# Patient Record
Sex: MALE | Race: WHITE | NOT HISPANIC OR LATINO | URBAN - METROPOLITAN AREA
[De-identification: names, ages, dates, MRNs, and addresses within clinical notes are randomized per-mention and may not be internally consistent; named-entity substitution may affect disease eponyms.]

---

## 2017-09-05 ENCOUNTER — EMERGENCY (EMERGENCY)
Facility: HOSPITAL | Age: 27
LOS: 1 days | Discharge: HOME | End: 2017-09-05
Attending: EMERGENCY MEDICINE | Admitting: EMERGENCY MEDICINE
Payer: COMMERCIAL

## 2017-09-05 VITALS
SYSTOLIC BLOOD PRESSURE: 141 MMHG | HEART RATE: 92 BPM | TEMPERATURE: 99 F | RESPIRATION RATE: 20 BRPM | OXYGEN SATURATION: 98 % | DIASTOLIC BLOOD PRESSURE: 82 MMHG

## 2017-09-05 DIAGNOSIS — R07.89 OTHER CHEST PAIN: ICD-10-CM

## 2017-09-05 DIAGNOSIS — M54.2 CERVICALGIA: ICD-10-CM

## 2017-09-05 DIAGNOSIS — Z79.899 OTHER LONG TERM (CURRENT) DRUG THERAPY: ICD-10-CM

## 2017-09-05 PROCEDURE — 71020: CPT | Mod: 26

## 2017-09-05 PROCEDURE — 99284 EMERGENCY DEPT VISIT MOD MDM: CPT | Mod: 25

## 2017-09-05 PROCEDURE — 93010 ELECTROCARDIOGRAM REPORT: CPT

## 2017-09-05 NOTE — ED PROVIDER NOTE - MEDICAL DECISION MAKING DETAILS
Pt with atypical CP.  Possibly MSK given neck pain as well.  EKG benign.  CXR negative.  Discussed ddx with pt.  Offered blood work but declines.  Will have him f/u with out pt PCP/cardiology.  Pt has no PCP here in [facility] so referral given.

## 2017-09-05 NOTE — ED PROVIDER NOTE - DIAGNOSTIC INTERPRETATION
ER Physician: Dr Womack   CHEST XRAY INTERPRETATION: lungs clear, heart shadow normal, bony structures intact

## 2017-09-05 NOTE — ED PROVIDER NOTE - OBJECTIVE STATEMENT
28 yo M w/ history of anxiety on sertraline complains of mid sternal chest tightness/pain and neck soreness today. Pt states he woke up with chest tightness, went to work and notes gradual onset of neck soreness with pain on turning neck while at work. no SOB, no visual changes, no numbness/tingling, no weakness, no abdominal pain, no N/V, no fever, no chills, no dizziness. Pt denies recent alcohol/drug use, new exercise regimen, spicy foods or change in appetite/diet.

## 2020-06-25 NOTE — ED PROVIDER NOTE - CPE EDP RESP NORM
SI with depression and anxiety, [facility]  Patient denies currently  Will consult psych for further evaluation given patient's social history and recent stressors normal...

## 2022-03-01 PROBLEM — Z00.00 ENCOUNTER FOR PREVENTIVE HEALTH EXAMINATION: Status: ACTIVE | Noted: 2022-03-01

## 2022-03-03 ENCOUNTER — APPOINTMENT (OUTPATIENT)
Dept: UROLOGY | Facility: CLINIC | Age: 32
End: 2022-03-03
Payer: COMMERCIAL

## 2022-03-03 VITALS
SYSTOLIC BLOOD PRESSURE: 115 MMHG | DIASTOLIC BLOOD PRESSURE: 76 MMHG | TEMPERATURE: 98.2 F | BODY MASS INDEX: 25.67 KG/M2 | WEIGHT: 200 LBS | HEIGHT: 74 IN | HEART RATE: 71 BPM

## 2022-03-03 DIAGNOSIS — Z78.9 OTHER SPECIFIED HEALTH STATUS: ICD-10-CM

## 2022-03-03 LAB
BILIRUB UR QL STRIP: NORMAL
CLARITY UR: CLEAR
COLLECTION METHOD: NORMAL
GLUCOSE UR-MCNC: NORMAL
HCG UR QL: 0.2 EU/DL
HGB UR QL STRIP.AUTO: NORMAL
KETONES UR-MCNC: NORMAL
LEUKOCYTE ESTERASE UR QL STRIP: NORMAL
NITRITE UR QL STRIP: NORMAL
PH UR STRIP: 6.5
PROT UR STRIP-MCNC: NORMAL
SP GR UR STRIP: 1.02

## 2022-03-03 PROCEDURE — 99204 OFFICE O/P NEW MOD 45 MIN: CPT

## 2022-03-03 PROCEDURE — 81003 URINALYSIS AUTO W/O SCOPE: CPT | Mod: QW

## 2022-03-03 RX ORDER — SERTRALINE HYDROCHLORIDE 150 MG/1
CAPSULE ORAL
Refills: 0 | Status: ACTIVE | COMMUNITY

## 2022-03-03 NOTE — PHYSICAL EXAM
[General Appearance - Well Developed] : well developed [Normal Appearance] : normal appearance [General Appearance - In No Acute Distress] : no acute distress [Heart Rate And Rhythm] : Heart rate and rhythm were normal [] : no respiratory distress [Abdomen Soft] : soft [Abdomen Tenderness] : non-tender [Abdomen Hernia] : no hernia was discovered [Costovertebral Angle Tenderness] : no ~M costovertebral angle tenderness [Urethral Meatus] : meatus normal [Penis Abnormality] : normal uncircumcised penis [Epididymis] : the epididymides were normal [Testes Tenderness] : no tenderness of the testes [Testes Mass (___cm)] : there were no testicular masses [Normal Station and Gait] : the gait and station were normal for the patient's age [Skin Color & Pigmentation] : normal skin color and pigmentation [No Focal Deficits] : no focal deficits [Oriented To Time, Place, And Person] : oriented to person, place, and time [FreeTextEntry1] : Left grade 3 and right grade 2 varicoceles

## 2022-03-03 NOTE — ASSESSMENT
[FreeTextEntry1] : 32 yo male desires fertility after not having success conceiving for 18 months.  He has varicoceles on exam today.  He has never had a semen analysis.  We will get a SA as part of an initial evaluation.  If abnormalities are noted then we will proceed with scrotal US.  I explained that varicoceles can negatively impact normal spermatogenesis.  He agrees with the plan.\par \par Plan:\par 1. SA\par 2. F/u after # 1

## 2022-03-03 NOTE — HISTORY OF PRESENT ILLNESS
[FreeTextEntry1] : 32 yo male and his 32 yo wife have been trying to conceive for 18 months without success.  Neither of them have been pregnant before.  His wife is also undergoing fertility evaluation.  They have been using ovulation kits and apps to help with timing intercourse.  They do not use spermicidal lubricants.  He is otherwise healthy and has not been exposed to any gonadotoxin.  He has normal erections and ejaculation.

## 2022-03-24 ENCOUNTER — APPOINTMENT (OUTPATIENT)
Dept: UROLOGY | Facility: CLINIC | Age: 32
End: 2022-03-24
Payer: COMMERCIAL

## 2022-03-24 VITALS
DIASTOLIC BLOOD PRESSURE: 78 MMHG | SYSTOLIC BLOOD PRESSURE: 109 MMHG | OXYGEN SATURATION: 97 % | BODY MASS INDEX: 25.67 KG/M2 | WEIGHT: 200 LBS | TEMPERATURE: 98 F | HEART RATE: 78 BPM | HEIGHT: 74 IN | RESPIRATION RATE: 18 BRPM

## 2022-03-24 DIAGNOSIS — N46.9 MALE INFERTILITY, UNSPECIFIED: ICD-10-CM

## 2022-03-24 PROCEDURE — 76870 US EXAM SCROTUM: CPT

## 2022-03-24 PROCEDURE — 93976 VASCULAR STUDY: CPT

## 2022-03-24 PROCEDURE — 99213 OFFICE O/P EST LOW 20 MIN: CPT

## 2022-03-24 NOTE — ASSESSMENT
[FreeTextEntry1] : 32 yo male with abnormal SA and b/l varicoceles confirmed on scrotal US today.  I recommended that he consider b/l varicocelectomy to correct his male factor infertility.  I will refer him to Dr. Meyers who's expertise is in male infertility, and specifically performing microscopic varicocelectomy.  The patient agrees with this plan.\par \par PLan:\par 1. Refer to Dr. Meyers for varicocelectomy consultation.

## 2022-03-24 NOTE — HISTORY OF PRESENT ILLNESS
[FreeTextEntry1] : 3/3/22:\par 30 yo male and his 30 yo wife have been trying to conceive for 18 months without success.  Neither of them have been pregnant before.  His wife is also undergoing fertility evaluation.  They have been using ovulation kits and apps to help with timing intercourse.  They do not use spermicidal lubricants.  He is otherwise healthy and has not been exposed to any gonadotoxin.  He has normal erections and ejaculation.  \par \par *************\par 3/24/22:\par 30 yo male returns for scrotal US.  His SA showed oligospermia with low morphology.  He was noted to have varicoceles on physical exam.  \par \par Scrotal US today confirms the presence of bilateral varicoceles. \par

## 2022-03-30 ENCOUNTER — APPOINTMENT (OUTPATIENT)
Dept: UROLOGY | Facility: AMBULATORY SURGERY CENTER | Age: 32
End: 2022-03-30

## 2022-03-30 ENCOUNTER — APPOINTMENT (OUTPATIENT)
Dept: UROLOGY | Facility: CLINIC | Age: 32
End: 2022-03-30
Payer: COMMERCIAL

## 2022-03-30 VITALS
HEART RATE: 67 BPM | TEMPERATURE: 97.3 F | DIASTOLIC BLOOD PRESSURE: 62 MMHG | SYSTOLIC BLOOD PRESSURE: 101 MMHG | OXYGEN SATURATION: 98 %

## 2022-03-30 DIAGNOSIS — I86.1 SCROTAL VARICES: ICD-10-CM

## 2022-03-30 LAB
BILIRUB UR QL STRIP: NORMAL
CLARITY UR: CLEAR
COLLECTION METHOD: NORMAL
GLUCOSE UR-MCNC: NORMAL
HCG UR QL: 0.2 EU/DL
HGB UR QL STRIP.AUTO: NORMAL
KETONES UR-MCNC: NORMAL
LEUKOCYTE ESTERASE UR QL STRIP: NORMAL
NITRITE UR QL STRIP: NORMAL
PH UR STRIP: 7
PROT UR STRIP-MCNC: NORMAL
SP GR UR STRIP: 1.01

## 2022-03-30 PROCEDURE — 81003 URINALYSIS AUTO W/O SCOPE: CPT | Mod: QW

## 2022-03-30 PROCEDURE — 99215 OFFICE O/P EST HI 40 MIN: CPT

## 2022-03-30 NOTE — HISTORY OF PRESENT ILLNESS
[FreeTextEntry1] : 3/3/22:\par 32 yo male and his 30 yo wife have been trying to conceive for 18 months without success. Neither of them have been pregnant before. His wife is also undergoing fertility evaluation. They have been using ovulation kits and apps to help with timing intercourse. They do not use spermicidal lubricants. He is otherwise healthy and has not been exposed to any gonadotoxin. He has normal erections and ejaculation. \par \par *************\par 3/24/22:\par 32 yo male returns for scrotal US. His SA showed oligospermia with low morphology. He was noted to have varicoceles on physical exam. \par \par Patient seen TODAY 3/30/2022 to discuss options to treat the varicoceles. He denies any recreational drug use, uses e-gicarettes, ETOH 6/week, no environmental exposure.\par \par SA 3/10/2022:\par Vol 2.0 cc\par Conc 2 M/cc\par Motil 50%\par K Morphology Too Few to perform.\par \par Scrotal US:\par RIGHT HEMISCROTUM:\par Testis:		\par  Vol: 12.28 cc \par Vascularity:\par RI: 0.59\par Echogenicity: Normal, No microcalcifications visualized\par Masses: None\par Epididymis: Caput: 0.8 x 0.6 cm Body 0.2 cm\par Paratesticular findings: None\par \par LEFT HEMISCROTUM:\par Testis:		\par Vol: 11.38 cc \par  Vascularity:\par RI: 0.58 \par Echogenicity: Normal, No microcalcifications visualized\par Masses: none\par Epididymis: Caput: 0.5 x 1 cm Body: 0.3 cm\par Paratesticular findings: None\par \par Impression: \par 1.Bilateral varicocele visualized measuring 0.40 cm with Valsalva in right side and 0.39 cm with Valsalva in left side.\par \par \par  The patient denies fevers, chills, nausea and or vomiting and no unexplained weight loss. \par All pertinent parts of the patient PFSH (past medical, family and social histories), laboratory, radiological studies and physician notes were reviewed prior to starting the face to face portion of the  visit. Questionnaire results were discussed with patient.\par

## 2022-03-30 NOTE — ASSESSMENT
[FreeTextEntry1] : We discussed the bilateral varicoceles in detail along with their potential effects on the patient's fertility and the treatment options as listed below.\par \par  A varicocele is a collection of enlarged veins that drain either one or both testicles. It is located just above the affected testicle, in the upper scrotum. These veins are similar to varicose veins in the leg. As a result of being enlarged , these veins affect the blood circulation to the testicles. It is not necessary to have a varicocele on both sides to affect both testicles, since one varicocele can commonly affect both testicles.\par \par The effect of a varicocele is mostly on the production of sperm and, subsequently, on fertility. The varicocele commonly lowers the number of sperm produced and the movement, or motility, of the sperm. The varicocele can also affect the shape, or morphology, of the sperm. All these changes can lead to a decrease in the ability of a man to impregnate his wife when he has a varicocele. A varicocele can also lead to pain in one or both testes and it can sometimes be associated with atrophy, or shrinking of the testis. There is no evidence that a varicocele affects a man 's general health or shortens his life. It is not associated with the development of testicular cancer. The most common reason people seek treatment for a varicocele is for its affect on fertility.\par \par A varicocele is a common finding, being seen i n 15% of men. While most men who have a varicocele do not have infertility, in men who do have infertility we find the varicocele to be present in 40%. In men who have secondary infertility, which means that they have had at least one child and are having trouble conceiving the next child, we find the varicocele to be present in 80% of these men. This is apparently due to the varicocele having a progressive affect on the fertility potential in this population . While these men were younger, they may have been affected by the varicocele but were able to compensate for the effect due to the built in reserve in the system. As they get older, the effect of the varicocele is more pronounced  and they are no longer able to compensate adequately. Infertility results.\par \par The exact way in which the varicocele  affects fertility remains a mystery. Most investigators believe that the varicocele alters the normal blood flow past the testicles by allowing venous blood to pool around the testicles. This is believed to alter the temperature of the testicles and subsequently decrease fertility. Whether this is the only or the correct reason for the effect of the varicocele on fertility remains to be proven .\par \par Treatment of the varicocele can be performed one of several ways. The best way which is associated with the highest success rate and the lowest complication rate is the MICROSURGICAL INTERNAL SPERMATIC VEIN LIGATION WITH TESTICULAR EXPLORATION AND GUBERNACULAR VEIN LIGATION. If there is one varicocele, then this is performed on only that side. If there are two varicoceles present, then the procedure is performed on both sides at the same sitting. The procedure involves an incision on one side for each varicocele. It is made in the groin area and is similar to a hernia repair  incision. It extends for about  1 to  1 1/2 inches. The spermatic cord is explored using the operating microscope and instruments and the artery or arteries are each carefully  from the veins. The arteries are saved and the veins are tied or clipped and cut. The testicle is then brought through the incision. All veins on the surface of the testicle are then cut and tied . Everything is replaced and the wound is sewn closed. The skin is  closed with surgical staples. The procedure is  usually performed in an outpatient settling in the hospital. You come in, have the procedure done and go home the same day. Most men choose general anesthesia since it is easiest for you and you are not aware of any of the procedure. If you prefer, this can be done under spinal or epidural anesthesia as an alternate. At the end of the procedure you are given a long acting local anesthetic which keeps you pain free for the remainder of that day. Afterwards Extra Strength Tylenol is usually  adequate for any residual pain. You will be given a prescription for Tylenol and codeine just in case you need it. Usually patients are allowed to return to work after 5 days but you must limit you r sexual activity and exercise for 4 weeks after surgery.\par \par Using this technique, there are very few complications. There can be some post operative swelling or bleeding but this is mild and self limited. There is always the possibility for an infection but this is also rare.  [ ] does not routinely prescribe antibiotics . There is a chance that the varicocele can recur or can remain after surgery. This is due to the complex venous anatomy in this area and to the body's attempts to bypass the "blocked" vessels that have been tied. This occurs in about 5% of men when the MICROSURGICAL method is used as compared to about 15% when the older, non-microsurgical technique is employed. You may develop some fluid around the testicle after surgery. This is known as a hydrocele and usually has no effect on you or your sperm. It occurs less than 1% when the MICROSURGICAL method is used but is seen in about 5% of men who undergo the older method without the microscope . Any time we operate on the veins draining the testicle, there is a possibility that we may injure the arteries that feed the testicle. If this should happen , there is a remote chance that that testicle might stop functioning. If this were to happen to both you r testicles, you might become sterile and require some hormonal replacement therapy from that point on. However , this very rare complication has never happened to any of  [ ]'s patients in more than 15 years of practice. \par \par As  mentioned , there  are  some alternatives  to the  MICROSURGICAL  technique.  These  include  the  following: ( l )The older method with no magnification where as many veins as can be seen are tied. This method has a higher complication rate as described above and is less likely to succeed because it is difficult to tie all the veins while preserving the artery. (2) Percutaneous embolization of the veins. This technique uses x-ray guidance to place metal springs into the veins to clot them off. Because of the technical difficulty in this procedure, it is sometimes necessary to pass the catheter used to place the springs through the heart to gain access to some of these veins. (3) Laparoscopic vein ligation. This method relies on the placement of 4 to 5 telescopes into the abdominal cavity (4) Assisted Reproductive Technology. \par \par I recommended and set blood for AM hormonal panel today. I also recommended that the patient consider sperm cryopreservation prior to surgery due to the very low sperm concentration that he has now. He would like to proceed with surgery as I recommended and will consider the sperm cryopreservation I recommended. Vanessa Meyers MD\par

## 2022-03-30 NOTE — PHYSICAL EXAM
[General Appearance - Well Developed] : well developed [General Appearance - Well Nourished] : well nourished [Normal Appearance] : normal appearance [Well Groomed] : well groomed [General Appearance - In No Acute Distress] : no acute distress [Abdomen Soft] : soft [Abdomen Tenderness] : non-tender [Costovertebral Angle Tenderness] : no ~M costovertebral angle tenderness [Urethral Meatus] : meatus normal [Scrotum] : the scrotum was normal [Penis Abnormality] : normal uncircumcised penis [Epididymis] : the epididymides were normal [Testes Tenderness] : no tenderness of the testes [Testes Mass (___cm)] : there were no testicular masses [FreeTextEntry1] : Large bilateral varicoceles L>R, both empty in the supine position. [Edema] : no peripheral edema [] : no respiratory distress [Respiration, Rhythm And Depth] : normal respiratory rhythm and effort [Exaggerated Use Of Accessory Muscles For Inspiration] : no accessory muscle use [Oriented To Time, Place, And Person] : oriented to person, place, and time [Affect] : the affect was normal [Mood] : the mood was normal [Not Anxious] : not anxious [Normal Station and Gait] : the gait and station were normal for the patient's age [No Focal Deficits] : no focal deficits

## 2022-03-31 LAB
ESTRADIOL SERPL-MCNC: 12 PG/ML
FSH SERPL-MCNC: 8.1 IU/L
LH SERPL-ACNC: 10.8 IU/L
PROLACTIN SERPL-MCNC: 6.9 NG/ML
TESTOST SERPL-MCNC: 355 NG/DL
